# Patient Record
Sex: FEMALE | NOT HISPANIC OR LATINO
[De-identification: names, ages, dates, MRNs, and addresses within clinical notes are randomized per-mention and may not be internally consistent; named-entity substitution may affect disease eponyms.]

---

## 2017-04-27 ENCOUNTER — APPOINTMENT (OUTPATIENT)
Dept: INTERNAL MEDICINE | Facility: CLINIC | Age: 52
End: 2017-04-27

## 2017-04-28 ENCOUNTER — RESULT CHARGE (OUTPATIENT)
Age: 52
End: 2017-04-28

## 2017-04-28 LAB
GLUCOSE UR-MCNC: 0
HGB UR QL STRIP.AUTO: 0
PH UR STRIP: 0
SP GR UR STRIP: 1.02

## 2017-07-11 ENCOUNTER — APPOINTMENT (EMERGENCY)
Dept: RADIOLOGY | Facility: HOSPITAL | Age: 52
End: 2017-07-11
Payer: COMMERCIAL

## 2017-07-11 ENCOUNTER — HOSPITAL ENCOUNTER (EMERGENCY)
Facility: HOSPITAL | Age: 52
Discharge: HOME | End: 2017-07-11
Attending: EMERGENCY MEDICINE | Admitting: EMERGENCY MEDICINE
Payer: COMMERCIAL

## 2017-07-11 VITALS
RESPIRATION RATE: 16 BRPM | BODY MASS INDEX: 42.21 KG/M2 | WEIGHT: 285 LBS | SYSTOLIC BLOOD PRESSURE: 123 MMHG | HEART RATE: 68 BPM | DIASTOLIC BLOOD PRESSURE: 76 MMHG | TEMPERATURE: 98 F | HEIGHT: 69 IN | OXYGEN SATURATION: 98 %

## 2017-07-11 DIAGNOSIS — G56.21 ULNAR NEUROPATHY OF RIGHT UPPER EXTREMITY: Primary | ICD-10-CM

## 2017-07-11 PROCEDURE — 73110 X-RAY EXAM OF WRIST: CPT

## 2017-07-11 PROCEDURE — 73030 X-RAY EXAM OF SHOULDER: CPT

## 2017-07-11 PROCEDURE — 73060 X-RAY EXAM OF HUMERUS: CPT

## 2017-07-11 PROCEDURE — 99283 EMERGENCY DEPT VISIT LOW MDM: CPT

## 2017-07-11 RX ORDER — IBUPROFEN 800 MG/1
800 TABLET ORAL EVERY 6 HOURS PRN
Qty: 30 TABLET | Refills: 0 | Status: SHIPPED | OUTPATIENT
Start: 2017-07-11

## 2017-08-08 ENCOUNTER — ALLSCRIPTS OFFICE VISIT (OUTPATIENT)
Dept: OTHER | Facility: OTHER | Age: 52
End: 2017-08-08

## 2017-08-08 ENCOUNTER — TRANSCRIBE ORDERS (OUTPATIENT)
Dept: ADMINISTRATIVE | Facility: HOSPITAL | Age: 52
End: 2017-08-08

## 2017-08-08 DIAGNOSIS — M25.532 PAIN IN LEFT WRIST: ICD-10-CM

## 2017-08-08 DIAGNOSIS — M25.532 LEFT WRIST PAIN: Primary | ICD-10-CM

## 2017-08-14 ENCOUNTER — GENERIC CONVERSION - ENCOUNTER (OUTPATIENT)
Dept: OTHER | Facility: OTHER | Age: 52
End: 2017-08-14

## 2017-08-29 ENCOUNTER — ALLSCRIPTS OFFICE VISIT (OUTPATIENT)
Dept: OTHER | Facility: OTHER | Age: 52
End: 2017-08-29

## 2017-08-29 ENCOUNTER — HOSPITAL ENCOUNTER (OUTPATIENT)
Dept: MRI IMAGING | Facility: CLINIC | Age: 52
Discharge: HOME | End: 2017-08-29
Payer: COMMERCIAL

## 2017-08-29 DIAGNOSIS — M25.532 PAIN IN LEFT WRIST: ICD-10-CM

## 2017-08-29 PROCEDURE — 72141 MRI NECK SPINE W/O DYE: CPT

## 2017-09-05 ENCOUNTER — ALLSCRIPTS OFFICE VISIT (OUTPATIENT)
Dept: OTHER | Facility: OTHER | Age: 52
End: 2017-09-05

## 2017-09-08 ENCOUNTER — GENERIC CONVERSION - ENCOUNTER (OUTPATIENT)
Dept: OTHER | Facility: OTHER | Age: 52
End: 2017-09-08

## 2018-01-10 NOTE — PROCEDURES
Results/Data    Procedure: Electromyogram and Nerve Conduction Study  Indication: Right Upper Extremity   Referred by Dr Kaleb Palmer  The procedure's were discussed with the patient  Written consent was obtained prior to the procedure and is detailed in the patient's record  Prior to the start of the procedure a time out was taken and the identity of the patient was confirmed via name and date of birth with the patient  The correct site and the procedure to be performed were confirmed  The correct side was confirmed if applicable  The positioning of the patient was verified  The availability of the correct equipment was verified  Procedure Start Time: 10:15    Technique: A sterile concentric needle electrode was used  The patient tolerated the procedure well  There were no complications  Results : Motor and sensory nerve conduction studies were performed on the median and ulnar nerves   The median motor terminal latency was prolonged with a normal compound motor action potential amplitude and a slow conduction velocity across the wrist  The ulnar motor terminal latency was within normal limits with a normal compound motor action potential amplitude and a slow conduction velocity distally and across the elbow  The median and ulnar F wave latencies were within normal limits  The median sensory peak latency was prolonged with a normal sensory action potential amplitude  The ulnar sensory peak latency was prolonged with a normal sensory action potential amplitude  The median and ulnar palmar evoked response was within normal limits  Concentric needle examination was performed on various proximal and distal muscles including deltoid, biceps, triceps, FCU, APB, FDI and low cervical paraspinals  There was no evidence of active denervation in any of the muscles tested  Mild decreased recruitment was noted in the APB and FDI   The compound motor unit action potentials were of normal configuration with interference patterns being full or full for effort in the remaining muscles tested  Interpretation: There is electrophysiologic evidence of a:    1  Moderate median nerve compression neuropathy at the wrist with demyelinative changes, consistent with a diagnosis of carpal tunnel syndrome  2  Mild ulnar neuropathy at the elbow with demyelinative changes as evidenced by the slowing of conduction velocity across the elbow  In addition, moderate ulnar neuropathy at the wrist with demyelinative changes as evidenced by the abnormal ulnar motor and sensory nerve conduction studies  3  There is no evidence of a cervical radiculopathy  Clinical correlation is recommended        Signatures   Electronically signed by : Daljit Vázquez MD; Aug 29 2017 10:46AM EST                       (Author)

## 2018-01-13 VITALS
BODY MASS INDEX: 43.1 KG/M2 | WEIGHT: 291 LBS | DIASTOLIC BLOOD PRESSURE: 89 MMHG | HEIGHT: 69 IN | HEART RATE: 82 BPM | SYSTOLIC BLOOD PRESSURE: 118 MMHG

## 2018-01-14 VITALS
RESPIRATION RATE: 16 BRPM | SYSTOLIC BLOOD PRESSURE: 142 MMHG | WEIGHT: 290 LBS | HEIGHT: 69 IN | DIASTOLIC BLOOD PRESSURE: 92 MMHG | BODY MASS INDEX: 42.95 KG/M2 | HEART RATE: 62 BPM

## 2018-03-22 ENCOUNTER — TELEPHONE (OUTPATIENT)
Dept: NEUROLOGY | Facility: CLINIC | Age: 53
End: 2018-03-22

## 2018-03-22 NOTE — TELEPHONE ENCOUNTER
Called patient to scheduled appointment from wait list with Dr Valentino Dougherty  Patient stated she lives in [place] and is following her care with Dr Eva Marquis

## 2018-04-17 ENCOUNTER — APPOINTMENT (OUTPATIENT)
Dept: INTERNAL MEDICINE | Facility: CLINIC | Age: 53
End: 2018-04-17
Payer: COMMERCIAL

## 2018-04-17 LAB
GLUCOSE UR-MCNC: 0
HGB UR QL STRIP.AUTO: 0
PROT UR STRIP-MCNC: NORMAL
SP GR UR STRIP: 1.02

## 2018-04-17 PROCEDURE — 99396 PREV VISIT EST AGE 40-64: CPT

## 2018-05-03 ENCOUNTER — TELEPHONE (OUTPATIENT)
Dept: OBGYN CLINIC | Facility: CLINIC | Age: 53
End: 2018-05-03

## 2018-05-03 NOTE — TELEPHONE ENCOUNTER
Dr Cruz Nieto from [facility] has faxed over several times requesting medical records

## 2019-04-16 ENCOUNTER — APPOINTMENT (OUTPATIENT)
Dept: INTERNAL MEDICINE | Facility: CLINIC | Age: 54
End: 2019-04-16
Payer: COMMERCIAL

## 2019-04-16 LAB
GLUCOSE UR-MCNC: 0
HGB UR QL STRIP.AUTO: 0
PROT UR STRIP-MCNC: NORMAL
SP GR UR STRIP: 1.02

## 2019-04-16 PROCEDURE — 99396 PREV VISIT EST AGE 40-64: CPT

## 2020-09-11 ENCOUNTER — APPOINTMENT (OUTPATIENT)
Dept: INTERNAL MEDICINE | Facility: CLINIC | Age: 55
End: 2020-09-11
Payer: COMMERCIAL

## 2020-09-11 ENCOUNTER — RESULT CHARGE (OUTPATIENT)
Age: 55
End: 2020-09-11

## 2020-09-11 DIAGNOSIS — Z00.00 ENCOUNTER FOR GENERAL ADULT MEDICAL EXAMINATION W/OUT ABNORMAL FINDINGS: ICD-10-CM

## 2020-09-11 PROCEDURE — 99396 PREV VISIT EST AGE 40-64: CPT
